# Patient Record
Sex: FEMALE | Race: WHITE
[De-identification: names, ages, dates, MRNs, and addresses within clinical notes are randomized per-mention and may not be internally consistent; named-entity substitution may affect disease eponyms.]

---
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
## 2022-12-09 ENCOUNTER — HOSPITAL ENCOUNTER (EMERGENCY)
Dept: HOSPITAL 46 - ED | Age: 22
LOS: 1 days | Discharge: HOME | End: 2022-12-10
Payer: COMMERCIAL

## 2022-12-09 VITALS — BODY MASS INDEX: 29.7 KG/M2 | HEIGHT: 64 IN | WEIGHT: 173.99 LBS

## 2022-12-09 DIAGNOSIS — N83.202: ICD-10-CM

## 2022-12-09 DIAGNOSIS — Z88.2: ICD-10-CM

## 2022-12-09 DIAGNOSIS — N76.0: Primary | ICD-10-CM

## 2022-12-09 PROCEDURE — A9270 NON-COVERED ITEM OR SERVICE: HCPCS

## 2023-05-20 ENCOUNTER — HOSPITAL ENCOUNTER (EMERGENCY)
Dept: HOSPITAL 46 - ED | Age: 23
Discharge: HOME | End: 2023-05-20
Payer: COMMERCIAL

## 2023-05-20 VITALS — SYSTOLIC BLOOD PRESSURE: 124 MMHG | DIASTOLIC BLOOD PRESSURE: 93 MMHG

## 2023-05-20 VITALS — BODY MASS INDEX: 23.3 KG/M2 | WEIGHT: 136.47 LBS | HEIGHT: 64 IN

## 2023-05-20 DIAGNOSIS — F43.10: ICD-10-CM

## 2023-05-20 DIAGNOSIS — Z88.2: ICD-10-CM

## 2023-05-20 DIAGNOSIS — G47.00: ICD-10-CM

## 2023-05-20 DIAGNOSIS — R07.89: Primary | ICD-10-CM

## 2023-05-20 NOTE — XMS
PreManage Notification: SALOME URIOSTEGUI MRN:[id number]
 
Security Information
 
Security Events
1 event(s) in the past 18 months
Most recent security events:
 
Elopement at [facility] 09/09/2022 15:44
  -    Patient eloped before treatment completed.
  -    Patient with suicidal and/or homicidal ideations eloped.
  -    Patient eloped with IV in place.
Details:
    PATIENT LWBS
 
 
 
CRITERIA MET
------------
- [facility] - 2 Visits in 30 Days
 
 
CARE PROVIDERS
-------------------------------------------------------------------------------------
-Juliane-            Dentist: General Practice     [facility]
 
PHONE: [phone]
-------------------------------------------------------------------------------------
ALFREDITO MORRIS     : Clinical     Current
 
PHONE: [phone]
-------------------------------------------------------------------------------------
 
[facility] has no Care Guidelines for this patient.
 
E.D. VISIT COUNT (12 MO.)
-------------------------------------------------------------------------------------
4 [facility]
-------------------------------------------------------------------------------------
TOTAL 4
-------------------------------------------------------------------------------------
NOTE: Visits indicate total known visits.
 
ED/[facility] VISIT TRACKING (12 MO.)
-------------------------------------------------------------------------------------
05/20/2023 03:06
[facility]
 
TYPE: Emergency
 
COMPLAINT:
- R SIDE PAIN
-------------------------------------------------------------------------------------
04/24/2023 22:43
[facility]
 
TYPE: Emergency
 
 
COMPLAINT:
- THROAT ISSUES
 
DIAGNOSES:
- Acute upper respiratory infection, unspecified
- Allergy status to sulfonamides
-------------------------------------------------------------------------------------
12/09/2022 22:26
[facility] [place]
 
TYPE: Emergency
 
COMPLAINT:
- VAGINAL ISSUE
 
DIAGNOSES:
- Acute vaginitis
- Allergy status to sulfonamides
- Lower abdominal pain, unspecified
- Unspecified ovarian cyst, left side
-------------------------------------------------------------------------------------
09/09/2022 15:44
[facility] [place]
 
TYPE: Emergency
 
COMPLAINT:
- VAGINAL BLEEDING
-------------------------------------------------------------------------------------
 
 
INPATIENT VISIT TRACKING (12 MO.)
No inpatient visits to display in this time frame
 
[email or web address]

## 2023-08-12 ENCOUNTER — HOSPITAL ENCOUNTER (EMERGENCY)
Dept: HOSPITAL 46 - ED | Age: 23
Discharge: HOME | End: 2023-08-12
Payer: COMMERCIAL

## 2023-08-12 VITALS — DIASTOLIC BLOOD PRESSURE: 84 MMHG | SYSTOLIC BLOOD PRESSURE: 119 MMHG

## 2023-08-12 VITALS — HEIGHT: 64 IN | BODY MASS INDEX: 26.98 KG/M2 | WEIGHT: 158.01 LBS

## 2023-08-12 DIAGNOSIS — Z88.2: ICD-10-CM

## 2023-08-12 DIAGNOSIS — N94.2: Primary | ICD-10-CM

## 2023-08-12 LAB
EPI CELLS #/AREA URNS HPF: (no result) /LPF
HGB UR QL STRIP: NEGATIVE
KETONES UR QL STRIP: NEGATIVE
LEUKOCYTE ESTERASE UR QL STRIP: NEGATIVE
NITRITE UR QL STRIP: NEGATIVE